# Patient Record
Sex: MALE | URBAN - METROPOLITAN AREA
[De-identification: names, ages, dates, MRNs, and addresses within clinical notes are randomized per-mention and may not be internally consistent; named-entity substitution may affect disease eponyms.]

---

## 2021-03-15 ENCOUNTER — OFFICE VISIT (OUTPATIENT)
Dept: URBAN - METROPOLITAN AREA CLINIC 47 | Facility: CLINIC | Age: 51
End: 2021-03-15
Payer: COMMERCIAL

## 2021-03-15 PROCEDURE — 92004 COMPRE OPH EXAM NEW PT 1/>: CPT | Performed by: OPHTHALMOLOGY

## 2021-03-15 PROCEDURE — 92134 CPTRZ OPH DX IMG PST SGM RTA: CPT | Performed by: OPHTHALMOLOGY

## 2021-03-15 ASSESSMENT — INTRAOCULAR PRESSURE
OD: 20
OS: 14

## 2021-03-15 NOTE — IMPRESSION/PLAN
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 Plan: He complains of poor vision OS x 9 months. OCT is of poor quality but shows edema OU today. We discussed the findings and natural history. Avastin injected OU without complication after discussing the R/B//IA in detail. He will see Dr. Cristina Angel about his cataracts. We discussed his vision does not meet legal requirements for driving and he should not drive.  
RTC 4-6 weeks OCT OU; avastin OU#2

## 2021-04-19 ENCOUNTER — PROCEDURE (OUTPATIENT)
Dept: URBAN - METROPOLITAN AREA CLINIC 47 | Facility: CLINIC | Age: 51
End: 2021-04-19
Payer: COMMERCIAL

## 2021-04-19 DIAGNOSIS — E11.3313 TYPE 2 DIABETES MELLITUS WITH MODERATE NONPROLIFERATIVE DIABETIC RETINOPATHY WITH MACULAR EDEMA, BILATERAL: Primary | ICD-10-CM

## 2021-04-19 PROCEDURE — 92134 CPTRZ OPH DX IMG PST SGM RTA: CPT | Performed by: OPHTHALMOLOGY

## 2021-04-19 ASSESSMENT — INTRAOCULAR PRESSURE
OS: 12
OD: 8

## 2021-05-17 ENCOUNTER — OFFICE VISIT (OUTPATIENT)
Dept: URBAN - METROPOLITAN AREA CLINIC 47 | Facility: CLINIC | Age: 51
End: 2021-05-17
Payer: COMMERCIAL

## 2021-05-17 DIAGNOSIS — H25.043 POSTERIOR SUBCAPSULAR POLAR AGE-RELATED CATARACT, BILATERAL: ICD-10-CM

## 2021-05-17 PROCEDURE — 92134 CPTRZ OPH DX IMG PST SGM RTA: CPT | Performed by: OPHTHALMOLOGY

## 2021-05-17 ASSESSMENT — INTRAOCULAR PRESSURE
OD: 11
OS: 10

## 2021-05-17 NOTE — IMPRESSION/PLAN
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 Plan: He feels his vision is improving s/p avastin OU x2. OCT  shows CME OS>OD. We discussed the findings and natural history. Avastin injected OU without complication after discussing the R/B//IA in detail.   
RTC 4-6 weeks OCT OU; re-eval DME

## 2021-08-30 ENCOUNTER — OFFICE VISIT (OUTPATIENT)
Dept: URBAN - METROPOLITAN AREA CLINIC 47 | Facility: CLINIC | Age: 51
End: 2021-08-30
Payer: COMMERCIAL

## 2021-08-30 PROCEDURE — 92134 CPTRZ OPH DX IMG PST SGM RTA: CPT | Performed by: OPHTHALMOLOGY

## 2021-08-30 PROCEDURE — 92014 COMPRE OPH EXAM EST PT 1/>: CPT | Performed by: OPHTHALMOLOGY

## 2021-08-30 ASSESSMENT — INTRAOCULAR PRESSURE
OS: 13
OD: 10

## 2021-08-30 NOTE — IMPRESSION/PLAN
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 Plan: He was lost to f/u and returns with worse edema OU. OCT  shows severe CME OU. We discussed the findings and natural history. Based on today's findings, I recommend treatment. Avastin injected OU without complication after discussing the R/B//IA in detail. He will need PRP OU as well. thanks Maddison [facility] 4-6 weeks OCT OU; avastin OU

## 2021-09-28 ENCOUNTER — PROCEDURE (OUTPATIENT)
Dept: URBAN - METROPOLITAN AREA CLINIC 49 | Facility: CLINIC | Age: 51
End: 2021-09-28
Payer: COMMERCIAL

## 2021-09-28 PROCEDURE — 92134 CPTRZ OPH DX IMG PST SGM RTA: CPT | Performed by: OPHTHALMOLOGY

## 2021-09-28 ASSESSMENT — INTRAOCULAR PRESSURE
OD: 11
OS: 13

## 2021-11-29 ENCOUNTER — PROCEDURE (OUTPATIENT)
Dept: URBAN - METROPOLITAN AREA CLINIC 47 | Facility: CLINIC | Age: 51
End: 2021-11-29
Payer: COMMERCIAL

## 2021-11-29 PROCEDURE — 92134 CPTRZ OPH DX IMG PST SGM RTA: CPT | Performed by: OPHTHALMOLOGY

## 2021-11-29 ASSESSMENT — INTRAOCULAR PRESSURE
OD: 12
OS: 14

## 2022-01-31 ENCOUNTER — OFFICE VISIT (OUTPATIENT)
Dept: URBAN - METROPOLITAN AREA CLINIC 47 | Facility: CLINIC | Age: 52
End: 2022-01-31
Payer: COMMERCIAL

## 2022-01-31 DIAGNOSIS — E11.3513 TYPE 2 DIABETES MELLITUS WITH PROLIFERATIVE DIABETIC RETINOPATHY WITH MACULAR EDEMA, BILATERAL: ICD-10-CM

## 2022-01-31 DIAGNOSIS — H33.8 OTHER RETINAL DETACHMENTS: Primary | ICD-10-CM

## 2022-01-31 DIAGNOSIS — H43.12 VITREOUS HEMORRHAGE, LEFT EYE: ICD-10-CM

## 2022-01-31 DIAGNOSIS — E11.3311 TYPE 2 DIABETES MELLITUS WITH MODERATE NONPROLIFERATIVE DIABETIC RETINOPATHY WITH MACULAR EDEMA, RIGHT EYE: ICD-10-CM

## 2022-01-31 DIAGNOSIS — E11.3312 TYPE 2 DIABETES MELLITUS WITH MODERATE NONPROLIFERATIVE DIABETIC RETINOPATHY WITH MACULAR EDEMA, LEFT EYE: ICD-10-CM

## 2022-01-31 PROCEDURE — 92014 COMPRE OPH EXAM EST PT 1/>: CPT | Performed by: OPHTHALMOLOGY

## 2022-01-31 PROCEDURE — 92134 CPTRZ OPH DX IMG PST SGM RTA: CPT | Performed by: OPHTHALMOLOGY

## 2022-01-31 PROCEDURE — 67028 INJECTION EYE DRUG: CPT | Performed by: OPHTHALMOLOGY

## 2022-01-31 ASSESSMENT — INTRAOCULAR PRESSURE
OS: 17
OD: 12

## 2022-01-31 NOTE — IMPRESSION/PLAN
Impression: Type 2 diabetes mellitus with proliferative diabetic retinopathy with macular edema, bilateral: Y49.5982. Plan: see above. He will need PRP OU; we may consider trying lucentis/eylea after surgery.

## 2022-01-31 NOTE — IMPRESSION/PLAN
Impression: Other retinal detachments: H33.8. Plan: He was lost to f/u and complains of worse vision OU. OCT confirms severe CME/SRF OU. He has inferior vit heme OS and inferior SRF OU. B-scan OU today confirms inferior RD OU and no PVD. 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 We discussed the natural history and the risks and benefit of repairing the RD, which includes laser, pneumatic retinopexy, scleral buckle surgery, and/or vitrectomy surgery. The vision usually improves gradually over a period of several months to 1 year, but usually does not improve to normal. With RD repair, hopefully we can reduce the risk of further visual loss. Risks of surgery include but are not limited to loss of eye, blindness, infection, scar tissue formation, recurrent retinal tears and detachment, glaucoma, change in refractive error, ptosis, need for further surgery, epiretinal membranes, CME and use of gas or silicone oil. We discussed the possibility of serous RD but there is no intraocular inflammation and we would likely be unable to treat with PO steroids given his uncontrolled DM. Avastin injected OS without complication today. I recommend surgery OS tomorrow but he wants to defer until Friday and understands the risk of permanent vision loss by delaying surgery.  
PLAN: 25g PPV/MP/EL/CRYO/GAS OS; then eval OU next week and avastin OD followed by surgery OD

## 2022-02-04 ENCOUNTER — SURGERY (OUTPATIENT)
Dept: URBAN - METROPOLITAN AREA EXTERNAL CLINIC 26 | Facility: EXTERNAL CLINIC | Age: 52
End: 2022-02-04
Payer: COMMERCIAL

## 2022-02-04 PROCEDURE — 67108 REPAIR DETACHED RETINA: CPT | Performed by: OPHTHALMOLOGY

## 2022-02-05 ENCOUNTER — POST-OPERATIVE VISIT (OUTPATIENT)
Dept: URBAN - METROPOLITAN AREA CLINIC 7 | Facility: CLINIC | Age: 52
End: 2022-02-05
Payer: COMMERCIAL

## 2022-02-05 PROCEDURE — 99024 POSTOP FOLLOW-UP VISIT: CPT | Performed by: OPHTHALMOLOGY

## 2022-02-05 ASSESSMENT — INTRAOCULAR PRESSURE
OS: 9
OD: 13

## 2022-02-05 NOTE — IMPRESSION/PLAN
Impression: S/P 25g PPV/MP/EL/CRYO/GAS OS - 1 Day. Vitreous hemorrhage, left eye  H43.12. No infection. IOP satisfactory.   Excellent post op course   Post operative instructions reviewed - Condition is improving -no signs of infection Plan: RTC: 1-2 wk POS AAKASH Curry

## 2022-02-21 ENCOUNTER — POST-OPERATIVE VISIT (OUTPATIENT)
Dept: URBAN - METROPOLITAN AREA CLINIC 47 | Facility: CLINIC | Age: 52
End: 2022-02-21
Payer: COMMERCIAL

## 2022-02-21 PROCEDURE — 99024 POSTOP FOLLOW-UP VISIT: CPT | Performed by: OPHTHALMOLOGY

## 2022-02-21 RX ORDER — PREDNISOLONE ACETATE 10 MG/ML
1 % SUSPENSION/ DROPS OPHTHALMIC
Qty: 5 | Refills: 3 | Status: ACTIVE
Start: 2022-02-21

## 2022-02-21 ASSESSMENT — INTRAOCULAR PRESSURE
OS: 22
OD: 9

## 2022-02-21 NOTE — IMPRESSION/PLAN
Impression: S/P 25g PPV, PPV, MP, EL, CYRO, GAS OS - 17 Days. Other retinal detachments  H33.8. Plan: still with disc edema and inferior RD OU. Shifting fluid was present after laying the patient supine. BP was measured 197/101 today. He reports chronc HTN and his PMD has been unwilling to add meds. We discussed the findings and natural history. He acknowledged understanding the risk of permanent vision loss/blindness, stroke, MI, and death with untreated severe HTN. I recommend he seek a 2nd opinion for his HTN and he agrees to see another physician tomorrow. We discussed his RDs will not improve until BP is improved. We discussed the risk of subretinal PVR requiring more surgery with chronic serous RDs. We discussed he still need treatment for his PDR with avastin/PRP OD>>OS. observe today.  continue PF TID
RTC 3 weeks POS; dilate OU --Finish Ofloxacin 0.3%

## 2022-07-18 ENCOUNTER — OFFICE VISIT (OUTPATIENT)
Dept: URBAN - METROPOLITAN AREA CLINIC 47 | Facility: CLINIC | Age: 52
End: 2022-07-18
Payer: COMMERCIAL

## 2022-07-18 DIAGNOSIS — E11.3513 TYPE 2 DIABETES MELLITUS WITH PROLIFERATIVE DIABETIC RETINOPATHY WITH MACULAR EDEMA, BILATERAL: Primary | ICD-10-CM

## 2022-07-18 DIAGNOSIS — H33.8 OTHER RETINAL DETACHMENTS: ICD-10-CM

## 2022-07-18 DIAGNOSIS — E11.3311 TYPE 2 DIABETES MELLITUS WITH MODERATE NONPROLIFERATIVE DIABETIC RETINOPATHY WITH MACULAR EDEMA, RIGHT EYE: ICD-10-CM

## 2022-07-18 DIAGNOSIS — H43.11 VITREOUS HEMORRHAGE, RIGHT EYE: ICD-10-CM

## 2022-07-18 PROCEDURE — 92014 COMPRE OPH EXAM EST PT 1/>: CPT | Performed by: OPHTHALMOLOGY

## 2022-07-18 PROCEDURE — 92134 CPTRZ OPH DX IMG PST SGM RTA: CPT | Performed by: OPHTHALMOLOGY

## 2022-07-18 ASSESSMENT — INTRAOCULAR PRESSURE
OS: 18
OD: 11

## 2022-07-18 NOTE — IMPRESSION/PLAN
Impression: Type 2 diabetes mellitus with proliferative diabetic retinopathy with macular edema, bilateral: L23.4221. Plan: He was lost to f/u and complains of worse vision OD and has vit heme. OCT shows no CME/SRF OU. We discussed the findings and natural history. REcommend surgery and we discussed the R/IB/A in detail and all questions answered. He elects to proceed and understands I cannot guarantee vision improvement. thanks Maddison PLAN: 25g PPV/MP/EL OD with pre-op avastin OD

## 2022-07-18 NOTE — IMPRESSION/PLAN
Impression: Other retinal detachments: H33.8. Plan: He is doing much better with resolved serous RDs after starting dialysis and controlling his HTN better.

## 2022-08-09 ENCOUNTER — OFFICE VISIT (OUTPATIENT)
Dept: URBAN - METROPOLITAN AREA CLINIC 49 | Facility: CLINIC | Age: 52
End: 2022-08-09
Payer: COMMERCIAL

## 2022-08-09 DIAGNOSIS — H43.11 VITREOUS HEMORRHAGE, RIGHT EYE: ICD-10-CM

## 2022-08-09 DIAGNOSIS — E11.3311 TYPE 2 DIABETES MELLITUS WITH MODERATE NONPROLIFERATIVE DIABETIC RETINOPATHY WITH MACULAR EDEMA, RIGHT EYE: Primary | ICD-10-CM

## 2022-08-09 DIAGNOSIS — E11.3513 TYPE 2 DIABETES MELLITUS WITH PROLIFERATIVE DIABETIC RETINOPATHY WITH MACULAR EDEMA, BILATERAL: ICD-10-CM

## 2022-08-09 PROCEDURE — 67028 INJECTION EYE DRUG: CPT | Performed by: OPHTHALMOLOGY

## 2022-08-09 ASSESSMENT — INTRAOCULAR PRESSURE
OS: 12
OD: 14

## 2022-08-17 ENCOUNTER — OFFICE VISIT (OUTPATIENT)
Dept: URBAN - METROPOLITAN AREA CLINIC 50 | Facility: CLINIC | Age: 52
End: 2022-08-17
Payer: COMMERCIAL

## 2022-08-17 DIAGNOSIS — H21.01 HYPHEMA, RIGHT EYE: Primary | ICD-10-CM

## 2022-08-17 PROCEDURE — 99212 OFFICE O/P EST SF 10 MIN: CPT | Performed by: OPHTHALMOLOGY

## 2022-08-17 ASSESSMENT — INTRAOCULAR PRESSURE
OD: 23
OS: 14

## 2022-08-17 NOTE — IMPRESSION/PLAN
Impression: Hyphema, right eye: H21.01. Plan: Condition is stable. Will continue to monitor. Continue Ofloxacin and pred forte as previously prescribed by Dr. Yao Pascal office.

## 2022-08-23 ENCOUNTER — POST-OPERATIVE VISIT (OUTPATIENT)
Dept: URBAN - METROPOLITAN AREA CLINIC 49 | Facility: CLINIC | Age: 52
End: 2022-08-23
Payer: COMMERCIAL

## 2022-08-23 DIAGNOSIS — H43.11 VITREOUS HEMORRHAGE, RIGHT EYE: Primary | ICD-10-CM

## 2022-08-23 PROCEDURE — 99024 POSTOP FOLLOW-UP VISIT: CPT | Performed by: OPHTHALMOLOGY

## 2022-08-23 ASSESSMENT — INTRAOCULAR PRESSURE
OS: 14
OD: 9

## 2022-08-23 NOTE — IMPRESSION/PLAN
Impression: S/P 25g ppv, mp, el OD - 7 Days. Vitreous hemorrhage, right eye  H43.11. Plan: taper off PF
alt prec RTC 1 month POS --Discontinue Ofloxacin 0.3%--Taper Prednisolone acetate 1% TID x 1 wk, BID x 1wk, QD x 1wk, then d/c

## 2022-10-31 ENCOUNTER — POST-OPERATIVE VISIT (OUTPATIENT)
Dept: URBAN - METROPOLITAN AREA CLINIC 47 | Facility: CLINIC | Age: 52
End: 2022-10-31
Payer: COMMERCIAL

## 2022-10-31 DIAGNOSIS — H43.11 VITREOUS HEMORRHAGE, RIGHT EYE: Primary | ICD-10-CM

## 2022-10-31 PROCEDURE — 92134 CPTRZ OPH DX IMG PST SGM RTA: CPT | Performed by: OPHTHALMOLOGY

## 2022-10-31 PROCEDURE — 99024 POSTOP FOLLOW-UP VISIT: CPT | Performed by: OPHTHALMOLOGY

## 2022-10-31 ASSESSMENT — INTRAOCULAR PRESSURE
OS: 11
OD: 10

## 2022-10-31 NOTE — IMPRESSION/PLAN
Impression: S/P 25g ppv, mp, el OD - 76 Days. Vitreous hemorrhage, right eye  H43.11. Plan: doing well and he's happy with his vision s/p PPV/EL OU for [facility] from severe HTN. OCT shows no CME/SRF OU and atrophic retina OU
continue dialysis per PMD
f/u with Dr. Sherita Sheppard for refraction RTC PRN